# Patient Record
Sex: FEMALE | Race: BLACK OR AFRICAN AMERICAN | NOT HISPANIC OR LATINO | URBAN - METROPOLITAN AREA
[De-identification: names, ages, dates, MRNs, and addresses within clinical notes are randomized per-mention and may not be internally consistent; named-entity substitution may affect disease eponyms.]

---

## 2023-05-25 ENCOUNTER — APPOINTMENT (RX ONLY)
Dept: URBAN - METROPOLITAN AREA CLINIC 88 | Facility: CLINIC | Age: 33
Setting detail: DERMATOLOGY
End: 2023-05-25

## 2023-05-25 DIAGNOSIS — T63.62 TOXIC EFFECT OF CONTACT WITH OTHER JELLYFISH: ICD-10-CM

## 2023-05-25 PROBLEM — T63.621A TOXIC EFFECT OF CONTACT WITH OTHER JELLYFISH, ACCIDENTAL (UNINTENTIONAL), INITIAL ENCOUNTER: Status: ACTIVE | Noted: 2023-05-25

## 2023-05-25 PROCEDURE — 99204 OFFICE O/P NEW MOD 45 MIN: CPT

## 2023-05-25 PROCEDURE — ? PRESCRIPTION

## 2023-05-25 PROCEDURE — ? TREATMENT REGIMEN

## 2023-05-25 PROCEDURE — ? COUNSELING

## 2023-05-25 RX ORDER — PREDNISONE 10 MG/1
TABLET ORAL
Qty: 30 | Refills: 0 | Status: ERX | COMMUNITY
Start: 2023-05-25

## 2023-05-25 RX ORDER — BETAMETHASONE DIPROPIONATE 0.5 MG/G
OINTMENT TOPICAL
Qty: 45 | Refills: 0 | Status: ERX | COMMUNITY
Start: 2023-05-25

## 2023-05-25 RX ADMIN — BETAMETHASONE DIPROPIONATE: 0.5 OINTMENT TOPICAL at 00:00

## 2023-05-25 RX ADMIN — PREDNISONE: 10 TABLET ORAL at 00:00

## 2023-05-25 NOTE — PROCEDURE: TREATMENT REGIMEN
Detail Level: Zone
Otc Regimen: Zyrtec 10mg - 1 po qd
Initiate Treatment: betamethasone dipropionate 0.05 % topical ointment \\nQuantity: 45.0 g  Days Supply: 14\\nSig: Aaa bid x 2 weeks\\n\\nprednisone 10 mg tablet \\nQuantity: 30.0 Tablet  Days Supply: 12\\nSig: Take 4 pills x 3 days, 3 pills x 3 days, 2 pills x 3 days, 1 pill x 3 days

## 2023-05-25 NOTE — HPI: SKIN LESION
treated_been_treated
Is This A New Presentation, Or A Follow-Up?: Skin Lesions
Additional History: Patient reports she got stung by a jelly fish on Sunday.